# Patient Record
Sex: FEMALE | Race: BLACK OR AFRICAN AMERICAN | NOT HISPANIC OR LATINO | URBAN - METROPOLITAN AREA
[De-identification: names, ages, dates, MRNs, and addresses within clinical notes are randomized per-mention and may not be internally consistent; named-entity substitution may affect disease eponyms.]

---

## 2017-08-03 ENCOUNTER — HISTORICAL (OUTPATIENT)
Dept: ADMINISTRATIVE | Facility: HOSPITAL | Age: 72
End: 2017-08-03

## 2021-02-04 ENCOUNTER — HISTORICAL (OUTPATIENT)
Dept: RADIOLOGY | Facility: HOSPITAL | Age: 76
End: 2021-02-04

## 2021-02-04 LAB
ABS NEUT (OLG): 3.18 X10(3)/MCL (ref 2.1–9.2)
ALBUMIN SERPL-MCNC: 4.4 GM/DL (ref 3.4–4.8)
ALBUMIN/GLOB SERPL: 1.5 RATIO (ref 1.1–2)
ALP SERPL-CCNC: 80 UNIT/L (ref 40–150)
ALT SERPL-CCNC: 11 UNIT/L (ref 0–55)
AST SERPL-CCNC: 19 UNIT/L (ref 5–34)
BASOPHILS # BLD AUTO: 0 X10(3)/MCL (ref 0–0.2)
BASOPHILS NFR BLD AUTO: 1 %
BILIRUB SERPL-MCNC: 0.5 MG/DL
BILIRUBIN DIRECT+TOT PNL SERPL-MCNC: 0.2 MG/DL (ref 0–0.5)
BILIRUBIN DIRECT+TOT PNL SERPL-MCNC: 0.3 MG/DL (ref 0–0.8)
BUN SERPL-MCNC: 14.5 MG/DL (ref 9.8–20.1)
CALCIUM SERPL-MCNC: 9.4 MG/DL (ref 8.4–10.2)
CANCER AG19-9 SERPL-ACNC: <2.06 UNIT/ML (ref 0–37)
CHLORIDE SERPL-SCNC: 101 MMOL/L (ref 98–107)
CO2 SERPL-SCNC: 24 MMOL/L (ref 23–31)
CREAT SERPL-MCNC: 0.75 MG/DL (ref 0.55–1.02)
EOSINOPHIL # BLD AUTO: 0.3 X10(3)/MCL (ref 0–0.9)
EOSINOPHIL NFR BLD AUTO: 5 %
ERYTHROCYTE [DISTWIDTH] IN BLOOD BY AUTOMATED COUNT: 15.8 % (ref 11.5–17)
GLOBULIN SER-MCNC: 2.9 GM/DL (ref 2.4–3.5)
GLUCOSE SERPL-MCNC: 80 MG/DL (ref 82–115)
HCT VFR BLD AUTO: 36.1 % (ref 37–47)
HGB BLD-MCNC: 11.1 GM/DL (ref 12–16)
LYMPHOCYTES # BLD AUTO: 2 X10(3)/MCL (ref 0.6–4.6)
LYMPHOCYTES NFR BLD AUTO: 34 %
MCH RBC QN AUTO: 27.3 PG (ref 27–31)
MCHC RBC AUTO-ENTMCNC: 30.7 GM/DL (ref 33–36)
MCV RBC AUTO: 88.7 FL (ref 80–94)
MONOCYTES # BLD AUTO: 0.4 X10(3)/MCL (ref 0.1–1.3)
MONOCYTES NFR BLD AUTO: 7 %
NEUTROPHILS # BLD AUTO: 3.18 X10(3)/MCL (ref 2.1–9.2)
NEUTROPHILS NFR BLD AUTO: 53 %
PLATELET # BLD AUTO: 306 X10(3)/MCL (ref 130–400)
PMV BLD AUTO: 10.8 FL (ref 9.4–12.4)
POC CREATININE: 0.8 MG/DL (ref 0.6–1.3)
POTASSIUM SERPL-SCNC: 4.5 MMOL/L (ref 3.5–5.1)
PROT SERPL-MCNC: 7.3 GM/DL (ref 5.8–7.6)
RBC # BLD AUTO: 4.07 X10(6)/MCL (ref 4.2–5.4)
SODIUM SERPL-SCNC: 140 MMOL/L (ref 136–145)
WBC # SPEC AUTO: 6 X10(3)/MCL (ref 4.5–11.5)

## 2021-11-10 LAB — BCS RECOMMENDATION EXT: NORMAL

## 2022-04-29 DIAGNOSIS — C23 GALLBLADDER CANCER: Primary | ICD-10-CM

## 2022-05-10 PROBLEM — C23 MALIGNANT NEOPLASM OF GALLBLADDER: Status: ACTIVE | Noted: 2022-05-10

## 2022-05-10 NOTE — PROGRESS NOTES
Patient ID: Ena Kruse is a 76 y.o. female.    Chief Complaint: gallbladder cancer      History of Present Illness  Chief complaint: gallbladder cancer    Cardiology: [facility] Dr Palu    HPI: 77 y/o F w/ PMHx of ROWDY on CPAP (not compliant), OHS, CHF, CAD, PAD/PVD s/p left iliac vein stent, COPD, HTN, HLD, obesity, lymphedema LLE due to blood clot?, GERD presented 12/2020 with symptoms consistent with acute cholecystitis with acute pancreatitis. She had cholecystectomy with Dr Olivo on 12/7/20, incidentally found to have well differentiated adenocarcinoma, +LVI, negative margins, pT2b. Referred to [facility] to establish care.    1/2020 colonoscopy with Dr Sherman showing internal hemorrhoids, diverticulosis. Had GI bleed 1/2020    She opted against adjuvant xeloda and adjuvant RT    Today 5/10/22: Patient here today for follow up visit. She states she feels well overall. Appetite is very good and she has gained about 30 lbs since last August. She admits she has not been watching what she eats and is not very active. She denies fevers, chills, CP, abdominal pain,  n/v/c/d, melena, hematochezia, hematuria, or polyuria. She does feel weak but this is likely due to significant deconditioning. She did not do labs prior to visit as previously ordered. No concerns reported.    PMHx: ROWDY on CPAP (not compliant), OHS, CHF, CAD, PAD/PVD s/p left iliac vein stent, COPD, HTN, HLD, obesity, lymphedema LLE due to blood clot?, GERD  PSHx: tubal ligation, cholecystectomy, kidney stone  Social Hx: denies tobacco, ETOH, drugs  Family Hx: father: lung or throat cancer (smoker), 3 brothers with colon cancer, sister with thyroid caner  Meds: reviewed  Allergies: oxycodone, prednisone    Labs:  1/9/22 CA 19-9 1 CEA <1.73 Cr 0.86 Alb 3.8 WBC 5.43 Hg 10.6 rbc 3.98 MCV 85.9 RDW 16.4   9/20/21 Cr 1.02 Alb 3.9 CEA 2.1 WBC 5.17 Hg 10.7  CA 19-9 <1  8/23/21 CEA 2.0, Cr 1.07, WBC 4.74, Hgb 10.3, , ANC 1.94, CA 19-9  <1  21 CA 19-9 <2 CBC and CMP wnl  20 Hg 9.5  20 Cr 0.92  20 Alb 3.1  12/3/20 amylase 2112  20 Lipase >2000 Tbili 5   AlkPhos 316    Imagin22 CT C/A/P: stable, nonspecific noncalcified bilateral pulmonary nodules. No other significant changes in the chest. S/p digna with no evidence of residual or recurrent tumor in the gallbladder fossa. Allowing for limitations of the exam, no other evidence of metastatic disease to the abdomen or pelvis. No other significant changes in the A/P.     21 screening MMG: BIRADS2    11/15/21 CT A/P w/ IV contrast: no evidence of recurrent tumor    21 CT C/A/P w/ IV contrast: no acute intrathoracic pathology. No evidence of metastatic to the chest. Borderline mild cardiomegaly, atherosclerotic disease, degenerative changes. Prominence of the main pulm artery. No acute intra abdominal or pelvic pathology. No evidence of residual or recurrent tumor. S/p cholecystectomy    21 CT A/P w/wo contrast: No acute abnormality. Cholecystectomy. Left renal cysts. Umbilical hernia no longer present. Atherosclerotic and degenerative changes.  21 CT C/A/P w/ IV contrast: Local recurrence or metastatic disease is not demonstrated. No acute abnormalities are seen. Hepatomegaly.    12/10/20 US RLE: no DVT  20 US abd: calcific sludge and gallstones in gallbladder lumen, consistent with acute cholecystitis. Hepatomegaly and steatosis. CBD borderline dilatation at 7mm    Path:  20 cholecystectomy: invasive adenocarcinoma well differentiated, 4.5x3cm in gallbladder neck, tumor invades perimuscular connective tissue on peritoneal and hepatic sides of gallbladder. Serosal surface, liver parenchyma are free of tumor. Margins are negative. LVI+. No PNI. No regional nodes.  pT2b Nx      Review of Systems  CONSTITUTIONAL: no fevers, no chills, no weight loss, no fatigue, + weakness  HEMATOLOGIC: no abnormal bleeding, no abnormal  bruising, no drenching night sweats  ONCOLOGIC: no new masses or lumps  HEENT: no vision loss, no tinnitus or hearing loss, no nose bleeding, no dysphagia, no odynophagia  CVS: no chest pain, no palpitations, no dyspnea on exertion  RESP: no shortness of breath, no hemoptysis, no cough  BREAST: no nipple discharge, no breast tenderness, no breast masses on self breast examination  GI: no nausea, no vomiting, no diarrhea, no constipation, no melena, no hematochezia, no hematemesis, no abdominal pain, no increase in abdominal girth  : no dysuria, no hematuria, no discharge  GYN: no abnormal vaginal bleeding, no dyspareunia, no vaginal discharge  INTEGUMENT: no rashes, no abnormal bruising, no nail pitting, no hyperpigmentation  NEURO: no falls, no memory loss, no paresthesias or dysesthesias, no urofecal incontinence or retention, no loss of strength on any extremity  MSK: no back pain, no new joint pain, no joint swelling  PSYCH: no suicidal or homicidal ideation, no depression, no insomnia, no anhedonia  ENDOCRINE: no heat or cold intolerance, no polyuria, no polydipsia      Objective:      Physical Exam  Vitals:    05/11/22 1119   BP: (!) 150/85   Pulse: 68   Resp: 16   Temp: 97.4 °F (36.3 °C)        ECOG PS 3  GA: AAOx3, NAD, morbidly obese  HEENT: NCAT, PERRLA, EOMI, poor dentition, no oral ulcers  LYMPH: no cervical, axillary or supraclavicular adenopathy  CVS: s1s2 RRR, no M/R/G  RESP: CTA b/l, no crackles, no wheezes or rhonchi  ABD: soft, NT, ND, BS+, no hepatosplenomegaly. 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 Stage IIB  CT C/A/P w/ IV contrast 2/2021 negative  Seen by SurgOnc and deemed not a surgical candidate due to multiple significant comorbidities  Requested MSI/MMR and PD-L1 on path specimen, no results  We previously discussed recommendation for adjuvant xeloda. Discussed that we can attempt at low dose to see if tolerated. Xeloda based adjuvant therapy would be 14/7 for 8 cycles.  Patient opted for no therapy, will image q6 months for 2 years and then as indicated  Referred to [facility] for opinion for possible adjuvant RT given no surgical intervention, she wished to continue with observation  Most recent imaging 5/2022 HARSHAD  MMG 11/2021 BIRADS2, repeat due in 1 year      Plan:       - Will obtain CBC, CMP, CEA and CA 19-9 today as labs were not done prior to visit as previously ordered. Will notify patient if concerning  - Most recent CT C/A/P 5/2022 stable with no evidence of residual or recurrent disease. Will plan for repeat surveillance scans in 6 months  - Discussed weight loss and exercise  - RTC in 3 months with CBC, CMP, CEA and CA 19-9  - Call if any questions or concerns    ERIN Webster

## 2022-05-11 ENCOUNTER — OFFICE VISIT (OUTPATIENT)
Dept: HEMATOLOGY/ONCOLOGY | Facility: CLINIC | Age: 77
End: 2022-05-11
Payer: MEDICARE

## 2022-05-11 VITALS
OXYGEN SATURATION: 94 % | BODY MASS INDEX: 47.09 KG/M2 | HEART RATE: 68 BPM | DIASTOLIC BLOOD PRESSURE: 85 MMHG | SYSTOLIC BLOOD PRESSURE: 150 MMHG | WEIGHT: 293 LBS | RESPIRATION RATE: 16 BRPM | TEMPERATURE: 97 F | HEIGHT: 66 IN

## 2022-05-11 DIAGNOSIS — C23 MALIGNANT NEOPLASM OF GALLBLADDER: Primary | ICD-10-CM

## 2022-05-11 PROCEDURE — 99213 OFFICE O/P EST LOW 20 MIN: CPT | Mod: ,,, | Performed by: NURSE PRACTITIONER

## 2022-05-11 PROCEDURE — 99213 PR OFFICE/OUTPT VISIT, EST, LEVL III, 20-29 MIN: ICD-10-PCS | Mod: ,,, | Performed by: NURSE PRACTITIONER

## 2022-05-11 RX ORDER — ALBUTEROL SULFATE 90 UG/1
AEROSOL, METERED RESPIRATORY (INHALATION)
COMMUNITY
Start: 2022-04-14

## 2022-05-11 RX ORDER — HYDRALAZINE HYDROCHLORIDE 50 MG/1
50 TABLET, FILM COATED ORAL 3 TIMES DAILY
COMMUNITY
Start: 2022-01-04

## 2022-05-11 RX ORDER — BUDESONIDE AND FORMOTEROL FUMARATE DIHYDRATE 160; 4.5 UG/1; UG/1
2 AEROSOL RESPIRATORY (INHALATION) 2 TIMES DAILY
COMMUNITY
Start: 2022-04-14

## 2022-05-11 RX ORDER — VALSARTAN AND HYDROCHLOROTHIAZIDE 320; 25 MG/1; MG/1
1 TABLET, FILM COATED ORAL DAILY
COMMUNITY
Start: 2022-04-14

## 2022-05-11 RX ORDER — CARVEDILOL 6.25 MG/1
6.25 TABLET ORAL 2 TIMES DAILY
COMMUNITY
Start: 2022-03-21

## 2022-05-11 RX ORDER — EZETIMIBE 10 MG/1
10 TABLET ORAL DAILY
COMMUNITY
Start: 2022-04-14

## 2022-05-11 RX ORDER — GABAPENTIN 300 MG/1
CAPSULE ORAL
COMMUNITY
Start: 2022-01-24

## 2022-05-11 RX ORDER — PANTOPRAZOLE SODIUM 40 MG/1
40 TABLET, DELAYED RELEASE ORAL DAILY
COMMUNITY
Start: 2022-04-14

## 2022-05-11 RX ORDER — IBUPROFEN 600 MG/1
600 TABLET ORAL DAILY
COMMUNITY
Start: 2022-04-21

## 2022-09-29 DIAGNOSIS — C23 MALIGNANT NEOPLASM OF GALLBLADDER: Primary | ICD-10-CM

## 2022-10-11 ENCOUNTER — OFFICE VISIT (OUTPATIENT)
Dept: HEMATOLOGY/ONCOLOGY | Facility: CLINIC | Age: 77
End: 2022-10-11
Payer: MEDICARE

## 2022-10-11 VITALS
HEIGHT: 69 IN | OXYGEN SATURATION: 97 % | HEART RATE: 70 BPM | WEIGHT: 293 LBS | BODY MASS INDEX: 43.4 KG/M2 | SYSTOLIC BLOOD PRESSURE: 174 MMHG | RESPIRATION RATE: 20 BRPM | DIASTOLIC BLOOD PRESSURE: 84 MMHG

## 2022-10-11 DIAGNOSIS — C23 MALIGNANT NEOPLASM OF GALLBLADDER: Primary | ICD-10-CM

## 2022-10-11 PROCEDURE — 99215 OFFICE O/P EST HI 40 MIN: CPT | Mod: ,,,

## 2022-10-11 PROCEDURE — 99215 PR OFFICE/OUTPT VISIT, EST, LEVL V, 40-54 MIN: ICD-10-PCS | Mod: ,,,

## 2022-10-11 NOTE — PROGRESS NOTES
Patient ID: Ena Kruse is a 77 y.o. female.    Chief Complaint: No chief complaint on file.      History of Present Illness  Chief complaint: gallbladder cancer    Cardiology: [facility] Dr Paul    HPI: 75 y/o F w/ PMHx of ROWDY on CPAP (not compliant), OHS, CHF, CAD, PAD/PVD s/p left iliac vein stent, COPD, HTN, HLD, obesity, lymphedema LLE due to blood clot?, GERD presented 12/2020 with symptoms consistent with acute cholecystitis with acute pancreatitis. She had cholecystectomy with Dr Olivo on 12/7/20, incidentally found to have well differentiated adenocarcinoma, +LVI, negative margins, pT2b. Referred to [facility] to establish care.    1/2020 colonoscopy with Dr Sherman showing internal hemorrhoids, diverticulosis. Had GI bleed 1/2020    She opted against adjuvant xeloda and adjuvant RT    Today 10/11/22: Patient here today for follow up visit. She states she feels well overall. Appetite is very good.  She denies fevers, chills, CP, abdominal pain,  n/v/c/d, melena, hematochezia, hematuria, or polyuria. No concerns reported.    PMHx: ROWDY on CPAP (not compliant), OHS, CHF, CAD, PAD/PVD s/p left iliac vein stent, COPD, HTN, HLD, obesity, lymphedema LLE due to blood clot?, GERD  PSHx: tubal ligation, cholecystectomy, kidney stone  Social Hx: denies tobacco, ETOH, drugs  Family Hx: father: lung or throat cancer (smoker), 3 brothers with colon cancer, sister with thyroid caner  Meds: reviewed  Allergies: oxycodone, prednisone    Labs:  8/29/2022 CA 19-9 <2, CEA 1.8, Cr 0.91, Alb 4.0, wbc 4.6, hgb 11.8, plt 270, ANC 2.04  1/9/22 CA 19-9 1 CEA <1.73 Cr 0.86 Alb 3.8 WBC 5.43 Hg 10.6 rbc 3.98 MCV 85.9 RDW 16.4   9/20/21 Cr 1.02 Alb 3.9 CEA 2.1 WBC 5.17 Hg 10.7  CA 19-9 <1  8/23/21 CEA 2.0, Cr 1.07, WBC 4.74, Hgb 10.3, , ANC 1.94, CA 19-9 <1  2/4/21 CA 19-9 <2 CBC and CMP wnl  12/23/20 Hg 9.5  12/17/20 Cr 0.92  12/12/20 Alb 3.1  12/3/20 amylase 2112  12/2/20 Lipase >2000 Tbili 5    AlkPhos 316    Imagin22 CT C/A/P: stable, nonspecific noncalcified bilateral pulmonary nodules. No other significant changes in the chest. S/p digna with no evidence of residual or recurrent tumor in the gallbladder fossa. Allowing for limitations of the exam, no other evidence of metastatic disease to the abdomen or pelvis. No other significant changes in the A/P.     21 screening MMG: BIRADS2    11/15/21 CT A/P w/ IV contrast: no evidence of recurrent tumor    21 CT C/A/P w/ IV contrast: no acute intrathoracic pathology. No evidence of metastatic to the chest. Borderline mild cardiomegaly, atherosclerotic disease, degenerative changes. Prominence of the main pulm artery. No acute intra abdominal or pelvic pathology. No evidence of residual or recurrent tumor. S/p cholecystectomy    21 CT A/P w/wo contrast: No acute abnormality. Cholecystectomy. Left renal cysts. Umbilical hernia no longer present. Atherosclerotic and degenerative changes.  21 CT C/A/P w/ IV contrast: Local recurrence or metastatic disease is not demonstrated. No acute abnormalities are seen. Hepatomegaly.    12/10/20 US RLE: no DVT  20 US abd: calcific sludge and gallstones in gallbladder lumen, consistent with acute cholecystitis. Hepatomegaly and steatosis. CBD borderline dilatation at 7mm    Path:  20 cholecystectomy: invasive adenocarcinoma well differentiated, 4.5x3cm in gallbladder neck, tumor invades perimuscular connective tissue on peritoneal and hepatic sides of gallbladder. Serosal surface, liver parenchyma are free of tumor. Margins are negative. LVI+. No PNI. No regional nodes.  pT2b Nx      Review of Systems  CONSTITUTIONAL: no fevers, no chills, no weight loss, no fatigue, + weakness  HEMATOLOGIC: no abnormal bleeding, no abnormal bruising, no drenching night sweats  ONCOLOGIC: no new masses or lumps  HEENT: no vision loss, no tinnitus or hearing loss, no nose bleeding, no dysphagia, no  odynophagia  CVS: no chest pain, no palpitations, no dyspnea on exertion  RESP: no shortness of breath, no hemoptysis, no cough  BREAST: no nipple discharge, no breast tenderness, no breast masses on self breast examination  GI: no nausea, no vomiting, no diarrhea, no constipation, no melena, no hematochezia, no hematemesis, no abdominal pain, no increase in abdominal girth  : no dysuria, no hematuria, no discharge  GYN: no abnormal vaginal bleeding, no dyspareunia, no vaginal discharge  INTEGUMENT: no rashes, no abnormal bruising, no nail pitting, no hyperpigmentation  NEURO: no falls, no memory loss, no paresthesias or dysesthesias, no urofecal incontinence or retention, no loss of strength on any extremity  MSK: no back pain, no new joint pain, no joint swelling  PSYCH: no suicidal or homicidal ideation, no depression, no insomnia, no anhedonia  ENDOCRINE: no heat or cold intolerance, no polyuria, no polydipsia      Objective:      Physical Exam  Vitals:    10/11/22 1129   BP: (!) 174/84   Pulse: 70   Resp: 20          ECOG PS 3  GA: AAOx3, NAD, morbidly obese  HEENT: NCAT, PERRLA, EOMI, poor dentition, no oral ulcers  LYMPH: no cervical, axillary or supraclavicular adenopathy  CVS: s1s2 RRR, no M/R/G  RESP: CTA b/l, no crackles, no wheezes or rhonchi  ABD: soft, NT, ND, BS+, no hepatosplenomegaly. 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 Stage IIB  CT C/A/P w/ IV contrast 2/2021 negative  Seen by SurgOnc and deemed not a surgical candidate due to multiple significant comorbidities  Requested MSI/MMR and PD-L1 on path specimen, no results  We previously discussed  recommendation for adjuvant xeloda. Discussed that we can attempt at low dose to see if tolerated. Xeloda based adjuvant therapy would be 14/7 for 8 cycles.  Patient opted for no therapy, will image q6 months for 2 years and then as indicated  Referred to [facility] for opinion for possible adjuvant RT given no surgical intervention, she wished to continue with observation  Most recent imaging 5/2022 HARSHAD  MMG 11/2021 BIRADS2, repeat due in 1 year      Plan:   Most recent CT C/A/P 5/2022 stable with no evidence of residual or recurrent disease.  Repeat CT CAP before return in 12/2022  CBC, CMP  CEA and CA 19-9 WNL today (repeat next year)  Reinforced the need for weight loss  - Call if any questions or concerns      Deepti Ramos, DANIELP-C

## 2023-02-02 ENCOUNTER — DOCUMENTATION ONLY (OUTPATIENT)
Dept: ADMINISTRATIVE | Facility: HOSPITAL | Age: 78
End: 2023-02-02
Payer: MEDICARE

## 2023-02-22 NOTE — PROGRESS NOTES
Patient ID: Ena Kruse is a 77 y.o. female.    Chief Complaint: No complaint        History of Present Illness  Chief complaint: gallbladder cancer    Cardiology: [facility] Dr Paul    HPI: 75 y/o F w/ PMHx of ROWDY on CPAP (not compliant), OHS, CHF, CAD, PAD/PVD s/p left iliac vein stent, COPD, HTN, HLD, obesity, lymphedema LLE due to blood clot?, GERD presented 12/2020 with symptoms consistent with acute cholecystitis with acute pancreatitis. She had cholecystectomy with Dr Olivo on 12/7/20, incidentally found to have well differentiated adenocarcinoma, +LVI, negative margins, pT2b. Referred to [facility] to establish care.    1/2020 colonoscopy with Dr Sherman showing internal hemorrhoids, diverticulosis. Had GI bleed 1/2020    She opted against adjuvant xeloda and adjuvant RT    Today 02/23/2023 :    Patient here today for follow up visit. She states she feels well overall. Appetite is very good.  She denies fevers, chills, CP, abdominal pain,  n/v/c/d, melena, hematochezia, hematuria, or polyuria. No concerns reported.      PMHx: ROWDY on CPAP (not compliant), OHS, CHF, CAD, PAD/PVD s/p left iliac vein stent, COPD, HTN, HLD, obesity, lymphedema LLE due to blood clot?, GERD  PSHx: tubal ligation, cholecystectomy, kidney stone  Social Hx: denies tobacco, ETOH, drugs  Family Hx: father: lung or throat cancer (smoker), 3 brothers with colon cancer, sister with thyroid caner  Meds: reviewed  Allergies: oxycodone, prednisone    Labs:  02/20/2023:  Creatinine 1.09, AST 16, ALT 13, TSH 1.5, WBC 4.4, hemoglobin 12.2, platelets 258  8/29/2022 CA 19-9 <2, CEA 1.8, Cr 0.91, Alb 4.0, wbc 4.6, hgb 11.8, plt 270, ANC 2.04  1/9/22 CA 19-9 1 CEA <1.73 Cr 0.86 Alb 3.8 WBC 5.43 Hg 10.6 rbc 3.98 MCV 85.9 RDW 16.4   9/20/21 Cr 1.02 Alb 3.9 CEA 2.1 WBC 5.17 Hg 10.7  CA 19-9 <1  8/23/21 CEA 2.0, Cr 1.07, WBC 4.74, Hgb 10.3, , ANC 1.94, CA 19-9 <1  2/4/21 CA 19-9 <2 CBC and CMP wnl  12/23/20 Hg 9.5  12/17/20 Cr  0.92  12/12/20 Alb 3.1  12/3/20 amylase 2112  12/2/20 Lipase >2000 Tbili 5   AlkPhos 316    Imaging:  - 1/17/2023 CT CAP:  Stable tiny vague pulmonary nodules within the left lung field.  No evidence of recurrent tumor.  No evidence of metastatic disease    5/9/22 CT C/A/P: stable, nonspecific noncalcified bilateral pulmonary nodules. No other significant changes in the chest. S/p digna with no evidence of residual or recurrent tumor in the gallbladder fossa. Allowing for limitations of the exam, no other evidence of metastatic disease to the abdomen or pelvis. No other significant changes in the A/P.     11/17/21 screening MMG: BIRADS2    11/15/21 CT A/P w/ IV contrast: no evidence of recurrent tumor    9/20/21 CT C/A/P w/ IV contrast: no acute intrathoracic pathology. No evidence of metastatic to the chest. Borderline mild cardiomegaly, atherosclerotic disease, degenerative changes. Prominence of the main pulm artery. No acute intra abdominal or pelvic pathology. No evidence of residual or recurrent tumor. S/p cholecystectomy    6/14/21 CT A/P w/wo contrast: No acute abnormality. Cholecystectomy. Left renal cysts. Umbilical hernia no longer present. Atherosclerotic and degenerative changes.  2/4/21 CT C/A/P w/ IV contrast: Local recurrence or metastatic disease is not demonstrated. No acute abnormalities are seen. Hepatomegaly.    12/10/20 US RLE: no DVT  12/2/20 US abd: calcific sludge and gallstones in gallbladder lumen, consistent with acute cholecystitis. Hepatomegaly and steatosis. CBD borderline dilatation at 7mm    Path:  12/7/20 cholecystectomy: invasive adenocarcinoma well differentiated, 4.5x3cm in gallbladder neck, tumor invades perimuscular connective tissue on peritoneal and hepatic sides of gallbladder. Serosal surface, liver parenchyma are free of tumor. Margins are negative. LVI+. No PNI. No regional nodes.  pT2b Nx      Review of Systems   Constitutional: Negative.  Negative for  chills, fever and malaise/fatigue.   HENT: Negative.     Eyes: Negative.    Respiratory: Negative.  Negative for cough and shortness of breath.    Cardiovascular: Negative.  Negative for chest pain.   Gastrointestinal: Negative.  Negative for abdominal pain, diarrhea, nausea and vomiting.   Genitourinary: Negative.    Musculoskeletal: Negative.    Skin: Negative.  Negative for itching and rash.   Neurological: Negative.  Negative for focal weakness.   Endo/Heme/Allergies: Negative.    Psychiatric/Behavioral: Negative.          Objective:      Physical Exam  Vitals:    02/23/23 1527   BP: (!) 164/90   Pulse: 64   Resp: 18   Temp: 97.4 °F (36.3 °C)            ECOG PS 3  GA: AAOx3, NAD, morbidly obese  HEENT: NCAT, PERRLA, EOMI, poor dentition, no oral ulcers  LYMPH: no cervical, axillary or supraclavicular adenopathy  CVS: s1s2 RRR, no M/R/G  RESP: CTA b/l, no crackles, no wheezes or rhonchi  ABD: soft, NT, ND, BS+, no hepatosplenomegaly. 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 Stage IIB  CT C/A/P w/ IV contrast 2/2021 negative  Seen by SurgOnc and deemed not a surgical candidate due to multiple significant comorbidities  Requested MSI/MMR and PD-L1 on path specimen, no results  We previously discussed recommendation for adjuvant xeloda. Discussed that we can attempt at low dose to see if tolerated. Xeloda based adjuvant therapy would be 14/7 for 8 cycles.  Patient opted for no therapy, will image q6 months for 2 years and then as indicated  Referred to [facility] for opinion for possible adjuvant RT given no surgical intervention, she wished to continue with observation  Most recent  imaging 5/2022 HARSHAD  MMG 11/2021 BIRADS2, repeat due in 1 year  CT C/A/P 1/2023 stable with no evidence of residual or recurrent disease.      Plan:       - labs and imaging reviewed, continue with surveillance with repeat imaging every 3-6 months for 2 years then every 6-12 months for up to 5 years or as clinically indicated.  Continue to monitor CEA and CA 19-9  - Repeat labs on follow up: CBC, CMP, CEA,    - MD / LABS VISIT - 12 WEEKS     The patient was seen, interviewed and examined. Pertinent lab and radiology studies were reviewed. Pt instructed to call should develop concerning signs/symptoms or have further questions.       Portions of the record may have been created with voice recognition software. Occasional wrong-word or sound-a-like substitutions may have occurred due to the inherent limitations of voice recognition software. Read the chart carefully and recognize, using context, where substitutions have occurred.    Caroline Tatum MD  Hematology / Oncology

## 2023-02-23 ENCOUNTER — OFFICE VISIT (OUTPATIENT)
Dept: HEMATOLOGY/ONCOLOGY | Facility: CLINIC | Age: 78
End: 2023-02-23
Payer: MEDICARE

## 2023-02-23 VITALS
HEART RATE: 64 BPM | SYSTOLIC BLOOD PRESSURE: 164 MMHG | RESPIRATION RATE: 18 BRPM | BODY MASS INDEX: 43.4 KG/M2 | OXYGEN SATURATION: 98 % | DIASTOLIC BLOOD PRESSURE: 90 MMHG | HEIGHT: 69 IN | TEMPERATURE: 97 F | WEIGHT: 293 LBS

## 2023-02-23 DIAGNOSIS — C23 MALIGNANT NEOPLASM OF GALLBLADDER: Primary | ICD-10-CM

## 2023-02-23 PROCEDURE — 99214 OFFICE O/P EST MOD 30 MIN: CPT | Mod: ,,, | Performed by: INTERNAL MEDICINE

## 2023-02-23 PROCEDURE — 99214 PR OFFICE/OUTPT VISIT, EST, LEVL IV, 30-39 MIN: ICD-10-PCS | Mod: ,,, | Performed by: INTERNAL MEDICINE

## 2023-02-23 RX ORDER — FLUTICASONE PROPIONATE 50 MCG
2 SPRAY, SUSPENSION (ML) NASAL DAILY
COMMUNITY
Start: 2023-02-13

## 2023-02-23 RX ORDER — ISOSORBIDE MONONITRATE 60 MG/1
60 TABLET, EXTENDED RELEASE ORAL 2 TIMES DAILY
COMMUNITY
Start: 2023-01-23

## 2023-02-23 RX ORDER — FUROSEMIDE 40 MG/1
40 TABLET ORAL EVERY MORNING
COMMUNITY
Start: 2023-01-23

## 2023-02-23 RX ORDER — OMEPRAZOLE 40 MG/1
1 CAPSULE, DELAYED RELEASE ORAL DAILY
COMMUNITY

## 2023-02-23 RX ORDER — NITROGLYCERIN 0.4 MG/1
TABLET SUBLINGUAL
COMMUNITY
Start: 2022-11-15

## 2023-02-23 RX ORDER — CLOPIDOGREL BISULFATE 75 MG/1
75 TABLET ORAL DAILY
COMMUNITY
Start: 2022-10-27

## 2023-02-23 RX ORDER — MIRABEGRON 25 MG/1
1 TABLET, FILM COATED, EXTENDED RELEASE ORAL DAILY
COMMUNITY

## 2023-05-23 ENCOUNTER — OFFICE VISIT (OUTPATIENT)
Dept: HEMATOLOGY/ONCOLOGY | Facility: CLINIC | Age: 78
End: 2023-05-23
Payer: MEDICARE

## 2023-05-23 VITALS
HEIGHT: 69 IN | WEIGHT: 293 LBS | OXYGEN SATURATION: 96 % | SYSTOLIC BLOOD PRESSURE: 176 MMHG | DIASTOLIC BLOOD PRESSURE: 90 MMHG | TEMPERATURE: 97 F | RESPIRATION RATE: 20 BRPM | HEART RATE: 67 BPM | BODY MASS INDEX: 43.4 KG/M2

## 2023-05-23 DIAGNOSIS — C23 MALIGNANT NEOPLASM OF GALLBLADDER: Primary | ICD-10-CM

## 2023-05-23 PROCEDURE — 99215 OFFICE O/P EST HI 40 MIN: CPT | Mod: ,,,

## 2023-05-23 PROCEDURE — 99215 PR OFFICE/OUTPT VISIT, EST, LEVL V, 40-54 MIN: ICD-10-PCS | Mod: ,,,

## 2023-05-23 RX ORDER — METHOCARBAMOL 750 MG/1
TABLET, FILM COATED ORAL
COMMUNITY
Start: 2022-08-02

## 2023-05-23 RX ORDER — POTASSIUM CHLORIDE 750 MG/1
CAPSULE, EXTENDED RELEASE ORAL
COMMUNITY

## 2023-05-23 RX ORDER — ROSUVASTATIN CALCIUM 40 MG/1
40 TABLET, COATED ORAL
COMMUNITY
Start: 2023-03-24

## 2023-05-23 RX ORDER — ASPIRIN 81 MG/1
TABLET ORAL
COMMUNITY
Start: 2022-08-02

## 2023-05-23 RX ORDER — VALSARTAN 320 MG/1
320 TABLET ORAL
COMMUNITY
Start: 2023-02-03

## 2023-05-23 NOTE — PROGRESS NOTES
Patient ID: Ena Kruse is a 77 y.o. female.    Chief Complaint: No chief complaint on file.        History of Present Illness  Chief complaint: gallbladder cancer    Cardiology: [facility] Dr Paul    HPI: 75 y/o F w/ PMHx of ROWDY on CPAP (not compliant), OHS, CHF, CAD, PAD/PVD s/p left iliac vein stent, COPD, HTN, HLD, obesity, lymphedema LLE due to blood clot?, GERD presented 12/2020 with symptoms consistent with acute cholecystitis with acute pancreatitis. She had cholecystectomy with Dr Olivo on 12/7/20, incidentally found to have well differentiated adenocarcinoma, +LVI, negative margins, pT2b. Referred to [facility] to establish care.    1/2020 colonoscopy with Dr Sherman showing internal hemorrhoids, diverticulosis. Had GI bleed 1/2020    She opted against adjuvant xeloda and adjuvant RT    Today 05/23/2023 Patient here today for follow up visit. She states she feels well overall. Appetite is very good.  She denies fevers, chills, CP, abdominal pain,  n/v/c/d, melena, hematochezia, hematuria, or polyuria. No concerns reported.      PMHx: ROWDY on CPAP (not compliant), OHS, CHF, CAD, PAD/PVD s/p left iliac vein stent, COPD, HTN, HLD, obesity, lymphedema LLE due to blood clot?, GERD  PSHx: tubal ligation, cholecystectomy, kidney stone  Social Hx: denies tobacco, ETOH, drugs  Family Hx: father: lung or throat cancer (smoker), 3 brothers with colon cancer, sister with thyroid caner  Meds: reviewed  Allergies: oxycodone, prednisone    Labs:  05/16/23:CEA <1.73, cr 0.90, alb 4.0, ca 9.38, LFTs WNL, Ca 19-9 < 2, wbc 4.52, hgb 12.7, plt 255, ANC 2.28  02/20/2023:  Creatinine 1.09, AST 16, ALT 13, TSH 1.5, WBC 4.4, hemoglobin 12.2, platelets 258  8/29/2022 CA 19-9 <2, CEA 1.8, Cr 0.91, Alb 4.0, wbc 4.6, hgb 11.8, plt 270, ANC 2.04  1/9/22 CA 19-9 1 CEA <1.73 Cr 0.86 Alb 3.8 WBC 5.43 Hg 10.6 rbc 3.98 MCV 85.9 RDW 16.4   9/20/21 Cr 1.02 Alb 3.9 CEA 2.1 WBC 5.17 Hg 10.7  CA 19-9 <1  8/23/21 CEA 2.0, Cr  1.07, WBC 4.74, Hgb 10.3, , ANC 1.94, CA 19-9 <1  21 CA 19-9 <2 CBC and CMP wnl  20 Hg 9.5  20 Cr 0.92  20 Alb 3.1  12/3/20 amylase 2112  20 Lipase >2000 Tbili 5   AlkPhos 316    Imagin23: MMG BIRADS 1 negative  - 2023 CT CAP:  Stable tiny vague pulmonary nodules within the left lung field.  No evidence of recurrent tumor.  No evidence of metastatic disease    22 CT C/A/P: stable, nonspecific noncalcified bilateral pulmonary nodules. No other significant changes in the chest. S/p digna with no evidence of residual or recurrent tumor in the gallbladder fossa. Allowing for limitations of the exam, no other evidence of metastatic disease to the abdomen or pelvis. No other significant changes in the A/P.     21 screening MMG: BIRADS2    11/15/21 CT A/P w/ IV contrast: no evidence of recurrent tumor    21 CT C/A/P w/ IV contrast: no acute intrathoracic pathology. No evidence of metastatic to the chest. Borderline mild cardiomegaly, atherosclerotic disease, degenerative changes. Prominence of the main pulm artery. No acute intra abdominal or pelvic pathology. No evidence of residual or recurrent tumor. S/p cholecystectomy    21 CT A/P w/wo contrast: No acute abnormality. Cholecystectomy. Left renal cysts. Umbilical hernia no longer present. Atherosclerotic and degenerative changes.  21 CT C/A/P w/ IV contrast: Local recurrence or metastatic disease is not demonstrated. No acute abnormalities are seen. Hepatomegaly.    12/10/20 US RLE: no DVT  20 US abd: calcific sludge and gallstones in gallbladder lumen, consistent with acute cholecystitis. Hepatomegaly and steatosis. CBD borderline dilatation at 7mm    Path:  20 cholecystectomy: invasive adenocarcinoma well differentiated, 4.5x3cm in gallbladder neck, tumor invades perimuscular connective tissue on peritoneal and hepatic sides of gallbladder. Serosal surface, liver parenchyma  are free of tumor. Margins are negative. LVI+. No PNI. No regional nodes.  pT2b Nx      Review of Systems   Constitutional: Negative.  Negative for chills, fever and malaise/fatigue.   HENT: Negative.     Eyes: Negative.    Respiratory: Negative.  Negative for cough and shortness of breath.    Cardiovascular: Negative.  Negative for chest pain.   Gastrointestinal: Negative.  Negative for abdominal pain, diarrhea, nausea and vomiting.   Genitourinary: Negative.    Musculoskeletal: Negative.    Skin: Negative.  Negative for itching and rash.   Neurological: Negative.  Negative for focal weakness.   Endo/Heme/Allergies: Negative.    Psychiatric/Behavioral: Negative.          Objective:      Physical Exam  Vitals:    05/23/23 1500   BP: (!) 176/90   Pulse: 67   Resp: 20   Temp: 97.2 °F (36.2 °C)              ECOG PS 3  GA: AAOx3, NAD, morbidly obese  HEENT: NCAT, PERRLA, EOMI, poor dentition, no oral ulcers  LYMPH: no cervical, axillary or supraclavicular adenopathy  CVS: s1s2 RRR, no M/R/G  RESP: CTA b/l, no crackles, no wheezes or rhonchi  ABD: soft, NT, ND, BS+, no hepatosplenomegaly. 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 Stage IIB  CT C/A/P w/ IV contrast 2/2021 negative  Seen by SurgOnc and deemed not a surgical candidate due to multiple significant comorbidities  Requested MSI/MMR and PD-L1 on path specimen, no results  We previously discussed recommendation for adjuvant xeloda. Discussed that we can attempt at low dose to see if tolerated. Xeloda based adjuvant therapy would be 14/7 for 8 cycles.  Patient opted for no therapy, will image q6 months for 2 years and then as  indicated  Referred to [facility] for opinion for possible adjuvant RT given no surgical intervention, she wished to continue with observation  Most recent imaging 5/2022 [facility]  MMG 11/2021 BIRADS2, repeat due in 1 year  CT C/A/P 1/2023 stable with no evidence of residual or recurrent disease.      Plan:       - labs and MMG reviewed, continue with surveillance with repeat imaging every 3-6 months for 2 years then every 6-12 months for up to 5 years or as clinically indicated.      - Continue to monitor CEA and CA 19-9  - Repeat labs on follow up: CBC, CMP, CEA, , along with repeat CT CAP  - RTC 3 months

## 2023-08-22 ENCOUNTER — OFFICE VISIT (OUTPATIENT)
Dept: HEMATOLOGY/ONCOLOGY | Facility: CLINIC | Age: 78
End: 2023-08-22
Payer: MEDICARE

## 2023-08-22 VITALS — WEIGHT: 293 LBS | HEIGHT: 69 IN | BODY MASS INDEX: 43.4 KG/M2

## 2023-08-22 DIAGNOSIS — C23 MALIGNANT NEOPLASM OF GALLBLADDER: Primary | ICD-10-CM

## 2023-08-22 PROCEDURE — 99442 PR PHYSICIAN TELEPHONE EVALUATION 11-20 MIN: CPT | Mod: 95,,,

## 2023-08-22 PROCEDURE — 99442 PR PHYSICIAN TELEPHONE EVALUATION 11-20 MIN: ICD-10-PCS | Mod: 95,,,

## 2023-08-22 RX ORDER — TIOTROPIUM BROMIDE 18 UG/1
18 CAPSULE ORAL; RESPIRATORY (INHALATION)
COMMUNITY

## 2023-08-22 RX ORDER — AMLODIPINE BESYLATE 5 MG/1
TABLET ORAL
COMMUNITY

## 2023-08-22 NOTE — PROGRESS NOTES
Established Patient - Audio Only Telehealth Visit     The patient location is: Home  The chief complaint leading to consultation is: Gallbladder cancer  Visit type: Virtual visit with audio only (telephone)  Total time spent with patient: 15 minutes       The reason for the audio only service rather than synchronous audio and video virtual visit was related to technical difficulties or patient preference/necessity.     Each patient to whom I provide medical services by telemedicine is:  (1) informed of the relationship between the physician and patient and the respective role of any other health care provider with respect to management of the patient; and (2) notified that they may decline to receive medical services by telemedicine and may withdraw from such care at any time. Patient verbally consented to receive this service via voice-only telephone call.          Patient ID: Ena Kruse is a 77 y.o. female.    Chief Complaint: Other (Cold, with cough and congestion no fever )        History of Present Illness  Chief complaint: gallbladder cancer    Cardiology: [facility] Dr Paul    HPI: 77 y/o F w/ PMHx of ROWDY on CPAP (not compliant), OHS, CHF, CAD, PAD/PVD s/p left iliac vein stent, COPD, HTN, HLD, obesity, lymphedema LLE due to blood clot?, GERD presented 12/2020 with symptoms consistent with acute cholecystitis with acute pancreatitis. She had cholecystectomy with Dr Olivo on 12/7/20, incidentally found to have well differentiated adenocarcinoma, +LVI, negative margins, pT2b. Referred to [facility] to establish care.    1/2020 colonoscopy with Dr Sherman showing internal hemorrhoids, diverticulosis. Had GI bleed 1/2020    She opted against adjuvant xeloda and adjuvant RT    Today 8/22/2023 Patient via telephone today for follow up visit. She has a history of COPD and reports worsening cough over the last 2 weeks. She denies fever or wheezing. She reports using inhaler and is taking OTC robitussin. She contacted  PCP office this morning and waiting for nurse to call back. She denies fevers, chills, CP, abdominal pain,  n/v/c/d, melena, hematochezia, hematuria, or polyuria. No concerns reported.      PMHx: ROWDY on CPAP (not compliant), OHS, CHF, CAD, PAD/PVD s/p left iliac vein stent, COPD, HTN, HLD, obesity, lymphedema LLE due to blood clot?, GERD  PSHx: tubal ligation, cholecystectomy, kidney stone  Social Hx: denies tobacco, ETOH, drugs  Family Hx: father: lung or throat cancer (smoker), 3 brothers with colon cancer, sister with thyroid caner  Meds: reviewed  Allergies: oxycodone, prednisone    Labs:  23: CEA 1.8, CA 19-9 <2,cr 0.85, alb 4.0, ca 9.5, LFTs WNL, wbc 4.12, hgb 12.1, plt 230, ANC 1.76  23:CEA <1.73, cr 0.90, alb 4.0, ca 9.38, LFTs WNL, Ca 19-9 < 2, wbc 4.52, hgb 12.7, plt 255, ANC 2.28  2023:  Creatinine 1.09, AST 16, ALT 13, TSH 1.5, WBC 4.4, hemoglobin 12.2, platelets 258  2022 CA 19-9 <2, CEA 1.8, Cr 0.91, Alb 4.0, wbc 4.6, hgb 11.8, plt 270, ANC 2.04  22 CA 19-9 1 CEA <1.73 Cr 0.86 Alb 3.8 WBC 5.43 Hg 10.6 rbc 3.98 MCV 85.9 RDW 16.4   21 Cr 1.02 Alb 3.9 CEA 2.1 WBC 5.17 Hg 10.7  CA 19-9 <1  21 CEA 2.0, Cr 1.07, WBC 4.74, Hgb 10.3, , ANC 1.94, CA 19-9 <1  21 CA 19-9 <2 CBC and CMP wnl  20 Hg 9.5  20 Cr 0.92  20 Alb 3.1  12/3/20 amylase 2112  20 Lipase >2000 Tbili 5   AlkPhos 316    Imagin23 CT C/A/P No evidence of residual or recurrent disease. Pulmonary nodules not noted on present imaging as seen previously on 23 .  23: MMG BIRADS 1 negative  - 2023 CT CAP:  Stable tiny vague pulmonary nodules within the left lung field.  No evidence of recurrent tumor.  No evidence of metastatic disease    22 CT C/A/P: stable, nonspecific noncalcified bilateral pulmonary nodules. No other significant changes in the chest. S/p digna with no evidence of residual or recurrent tumor in the  gallbladder fossa. Allowing for limitations of the exam, no other evidence of metastatic disease to the abdomen or pelvis. No other significant changes in the A/P.     11/17/21 screening MMG: BIRADS2    11/15/21 CT A/P w/ IV contrast: no evidence of recurrent tumor    9/20/21 CT C/A/P w/ IV contrast: no acute intrathoracic pathology. No evidence of metastatic to the chest. Borderline mild cardiomegaly, atherosclerotic disease, degenerative changes. Prominence of the main pulm artery. No acute intra abdominal or pelvic pathology. No evidence of residual or recurrent tumor. S/p cholecystectomy    6/14/21 CT A/P w/wo contrast: No acute abnormality. Cholecystectomy. Left renal cysts. Umbilical hernia no longer present. Atherosclerotic and degenerative changes.  2/4/21 CT C/A/P w/ IV contrast: Local recurrence or metastatic disease is not demonstrated. No acute abnormalities are seen. Hepatomegaly.    12/10/20 US RLE: no DVT  12/2/20 US abd: calcific sludge and gallstones in gallbladder lumen, consistent with acute cholecystitis. Hepatomegaly and steatosis. CBD borderline dilatation at 7mm    Path:  12/7/20 cholecystectomy: invasive adenocarcinoma well differentiated, 4.5x3cm in gallbladder neck, tumor invades perimuscular connective tissue on peritoneal and hepatic sides of gallbladder. Serosal surface, liver parenchyma are free of tumor. Margins are negative. LVI+. No PNI. No regional nodes.  pT2b Nx      Review of Systems   Constitutional: Negative.  Negative for chills, fever and malaise/fatigue.   HENT: Negative.     Eyes: Negative.    Respiratory: Negative.  Negative for cough and shortness of breath.    Cardiovascular: Negative.  Negative for chest pain.   Gastrointestinal: Negative.  Negative for abdominal pain, diarrhea, nausea and vomiting.   Genitourinary: Negative.    Musculoskeletal: Negative.    Skin: Negative.  Negative for itching and rash.   Neurological: Negative.  Negative for focal weakness.    Endo/Heme/Allergies: Negative.    Psychiatric/Behavioral: Negative.            Objective:      Physical Exam  There were no vitals filed for this visit.             ECOG PS 3  GA: AAOx3, NAD, morbidly obese  HEENT: NCAT, PERRLA, EOMI, poor dentition, no oral ulcers  LYMPH: no cervical, axillary or supraclavicular adenopathy  CVS: s1s2 RRR, no M/R/G  RESP: CTA b/l, no crackles, no wheezes or rhonchi  ABD: soft, NT, ND, BS+, no hepatosplenomegaly. 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 Stage IIB  CT C/A/P w/ IV contrast 2/2021 negative  Seen by SurgOnc and deemed not a surgical candidate due to multiple significant comorbidities  Requested MSI/MMR and PD-L1 on path specimen, no results  We previously discussed recommendation for adjuvant xeloda. Discussed that we can attempt at low dose to see if tolerated. Xeloda based adjuvant therapy would be 14/7 for 8 cycles.  Patient opted for no therapy, will image q6 months for 2 years and then as indicated  Referred to [facility] for opinion for possible adjuvant RT given no surgical intervention, she wished to continue with observation  Most recent imaging 5/2022 [facility]  MMG 11/2021 BIRADS2, repeat due in 1 year  CT C/A/P 1/2023 stable with no evidence of residual or recurrent disease.      Plan:       - labs and Imaging reviewed, continue with surveillance with repeat imaging every 3-6 months for 2 years then every 6-12 months for up to 5 years or as clinically indicated. - Continue to monitor CEA and CA 19-9  - Repeat labs on follow up: CBC, CMP, CEA,    - RTC 3 months  - Plan to repeat CT CAP 2/16/2023  - Follow-up with PCP for  COPD                           This service was not originating from a related E/M service provided within the previous 7 days nor will  to an E/M service or procedure within the next 24 hours or my soonest available appointment.  Prevailing standard of care was able to be met in this audio-only visit.

## 2023-11-28 ENCOUNTER — OFFICE VISIT (OUTPATIENT)
Dept: HEMATOLOGY/ONCOLOGY | Facility: CLINIC | Age: 78
End: 2023-11-28
Payer: MEDICARE

## 2023-11-28 VITALS
RESPIRATION RATE: 20 BRPM | DIASTOLIC BLOOD PRESSURE: 77 MMHG | HEIGHT: 69 IN | WEIGHT: 293 LBS | TEMPERATURE: 98 F | HEART RATE: 60 BPM | BODY MASS INDEX: 43.4 KG/M2 | OXYGEN SATURATION: 97 % | SYSTOLIC BLOOD PRESSURE: 173 MMHG

## 2023-11-28 DIAGNOSIS — C23 MALIGNANT NEOPLASM OF GALLBLADDER: Primary | ICD-10-CM

## 2023-11-28 PROCEDURE — 99213 OFFICE O/P EST LOW 20 MIN: CPT | Mod: ,,, | Performed by: NURSE PRACTITIONER

## 2023-11-28 PROCEDURE — 99213 PR OFFICE/OUTPT VISIT, EST, LEVL III, 20-29 MIN: ICD-10-PCS | Mod: ,,, | Performed by: NURSE PRACTITIONER

## 2023-11-28 RX ORDER — NYSTATIN 100000 U/G
CREAM TOPICAL 2 TIMES DAILY
COMMUNITY
Start: 2023-09-13

## 2023-11-28 RX ORDER — RANOLAZINE 500 MG/1
500 TABLET, EXTENDED RELEASE ORAL 2 TIMES DAILY
COMMUNITY
Start: 2023-11-15

## 2023-11-28 NOTE — PROGRESS NOTES
Patient ID: Ena Kruse is a 78 y.o. female.    Chief Complaint: Here for my check up.      History of Present Illness    Cardiology: [facility] Dr Paul    HPI: 75 y/o F w/ PMHx of ROWDY on CPAP (not compliant), OHS, CHF, CAD, PAD/PVD s/p left iliac vein stent, COPD, HTN, HLD, obesity, lymphedema LLE due to blood clot?, GERD presented 12/2020 with symptoms consistent with acute cholecystitis with acute pancreatitis. She had cholecystectomy with Dr Olivo on 12/7/20, incidentally found to have well differentiated adenocarcinoma, +LVI, negative margins, pT2b. Referred to [facility] to establish care.    1/2020 colonoscopy with Dr Sherman showing internal hemorrhoids, diverticulosis. Had GI bleed 1/2020    She opted against adjuvant xeloda and adjuvant RT    Interval History:    11/28/2023:  Mrs. Kruse is here today for her follow-up and labs for her history of gallbladder cancer with no adjuvant treatment.  Today, she reports, she is doing well. She has severe lymphedema in both lower legs, with left leg wrapped due to seepage of fluid.  She is in a wheelchair due to excess weight of 370 pounds and the lymph edema.  She denies any pain in the abdomen, bloating, indigestion, nausea, vomiting, fever, loss of appetite, change in bowel or bladder habits, no cough, confirms SOB with movement.  Lab reviewed with patient dated 11/21/2023:  Creatinine 0.87, liver enzymes WNL, CEA less than 1.73, WBC 5.60, Hgb 11.7, Hct 37.5, and plt 244,000.  CA 19-0 less than 2.  She has had no recent falls.  Eating well.  No recent hospitalizations, illness, or infections.      PMHx: ROWDY on CPAP (not compliant), OHS, CHF, CAD, PAD/PVD s/p left iliac vein stent, COPD, HTN, HLD, obesity, lymphedema LLE due to blood clot?, GERD  PSHx: tubal ligation, cholecystectomy, kidney stone  Social Hx: denies tobacco, ETOH, drugs  Family Hx: father: lung or throat cancer (smoker), 3 brothers with colon cancer, sister with thyroid caner  Meds:  reviewed  Allergies: oxycodone, prednisone    Labs:  2023:  Creatinine 0.87, liver enzymes WNL, CEA less than 1.73, WBC 5.60, Hgb 11.7, Hct 37.5, and plt 244,000.  CA 19-0 less than 2  23:CEA <1.73, cr 0.90, alb 4.0, ca 9.38, LFTs WNL, Ca 19-9 < 2, wbc 4.52, hgb 12.7, plt 255, ANC 2.28  2023:  Creatinine 1.09, AST 16, ALT 13, TSH 1.5, WBC 4.4, hemoglobin 12.2, platelets 258  2022 CA 19-9 <2, CEA 1.8, Cr 0.91, Alb 4.0, wbc 4.6, hgb 11.8, plt 270, ANC 2.04  22 CA 19-9 1 CEA <1.73 Cr 0.86 Alb 3.8 WBC 5.43 Hg 10.6 rbc 3.98 MCV 85.9 RDW 16.4   21 Cr 1.02 Alb 3.9 CEA 2.1 WBC 5.17 Hg 10.7  CA 19-9 <1  21 CEA 2.0, Cr 1.07, WBC 4.74, Hgb 10.3, , ANC 1.94, CA 19-9 <1  21 CA 19-9 <2 CBC and CMP wnl  20 Hg 9.5  20 Cr 0.92  20 Alb 3.1  12/3/20 amylase 2112  20 Lipase >2000 Tbili 5   AlkPhos 316    Imagin2023:  CT C/A/P:  No pulmonary nodules this exam.  Atherosclerotic and degenerative changes. Left renal cysts.     23: MMG BIRADS 1 negative  - 2023 CT CAP:  Stable tiny vague pulmonary nodules within the left lung field.  No evidence of recurrent tumor.  No evidence of metastatic disease    22 CT C/A/P: stable, nonspecific noncalcified bilateral pulmonary nodules. No other significant changes in the chest. S/p digna with no evidence of residual or recurrent tumor in the gallbladder fossa. Allowing for limitations of the exam, no other evidence of metastatic disease to the abdomen or pelvis. No other significant changes in the A/P.     21 screening MMG: BIRADS2    11/15/21 CT A/P w/ IV contrast: no evidence of recurrent tumor    21 CT C/A/P w/ IV contrast: no acute intrathoracic pathology. No evidence of metastatic to the chest. Borderline mild cardiomegaly, atherosclerotic disease, degenerative changes. Prominence of the main pulm artery. No acute intra abdominal or pelvic pathology. No evidence of  residual or recurrent tumor. S/p cholecystectomy    6/14/21 CT A/P w/wo contrast: No acute abnormality. Cholecystectomy. Left renal cysts. Umbilical hernia no longer present. Atherosclerotic and degenerative changes.  2/4/21 CT C/A/P w/ IV contrast: Local recurrence or metastatic disease is not demonstrated. No acute abnormalities are seen. Hepatomegaly.    12/10/20 US RLE: no DVT  12/2/20 US abd: calcific sludge and gallstones in gallbladder lumen, consistent with acute cholecystitis. Hepatomegaly and steatosis. CBD borderline dilatation at 7mm    Path:  12/7/20 cholecystectomy: invasive adenocarcinoma well differentiated, 4.5x3cm in gallbladder neck, tumor invades perimuscular connective tissue on peritoneal and hepatic sides of gallbladder. Serosal surface, liver parenchyma are free of tumor. Margins are negative. LVI+. No PNI. No regional nodes.  pT2b Nx      Review of Systems   Constitutional: Negative.  Negative for chills, fever and malaise/fatigue.   HENT: Negative.     Eyes: Negative.    Respiratory: Negative.  Negative for cough and shortness of breath.    Cardiovascular: Negative.  Negative for chest pain.   Gastrointestinal: Negative.  Negative for abdominal pain, diarrhea, nausea and vomiting.   Genitourinary: Negative.    Musculoskeletal: Negative.    Skin: Negative.  Negative for itching and rash.   Neurological: Negative.  Negative for focal weakness.   Endo/Heme/Allergies: Negative.    Psychiatric/Behavioral: Negative.            Objective:      Physical Exam  Vitals:    11/28/23 1414   BP: (!) 173/77   Pulse: 60   Resp: 20   Temp: 97.8 °F (36.6 °C)              ECOG PS 3  GA: AAOx3, NAD, morbidly obese  HEENT: NCAT, PERRLA, EOMI, poor dentition, no oral ulcers  LYMPH: no cervical, axillary or supraclavicular adenopathy  CVS: s1s2 RRR, no M/R/G  RESP: CTA b/l, no crackles, no wheezes or rhonchi  ABD: soft, NT, ND, BS+, no hepatosplenomegaly. 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 Stage IIB  CT C/A/P w/ IV contrast 2/2021 negative  Seen by SurgOnc and deemed not a surgical candidate due to multiple significant comorbidities  Requested MSI/MMR and PD-L1 on path specimen, no results  We previously discussed recommendation for adjuvant xeloda. Discussed that we can attempt at low dose to see if tolerated. Xeloda based adjuvant therapy would be 14/7 for 8 cycles.  Patient opted for no therapy, will image q6 months for 2 years and then as indicated  Referred to [facility] for opinion for possible adjuvant RT given no surgical intervention, she wished to continue with observation  Most recent imaging 5/2022 [facility]  MMG 11/2021 BIRADS2, repeat due in 1 year  CT C/A/P 1/2023 stable with no evidence of residual or recurrent disease.    11/28/2023 Assessment:  Patient is doing well.  No subjective or objective evidence of disease recurrence.  Labs stable.  CT 8/2023 with no evidence of disease recurrence.      Plan:     11/28/2023 Plan  Continue to monitor CEA and CA 19-9.   Repeat labs on follow up: CBC, CMP, CEA,  and return in 6 months for office visit.  CT C/A/P repeat in 1 year (8/2024).    The patient is in agreement with today's plan of care.  All questions answered.  Patient is aware to contact our office for any problems or concerns prior to next visit.      Kathie Ambriz, MSN-ED, APRN, AGACNP-BC, OCN

## 2024-05-29 ENCOUNTER — OFFICE VISIT (OUTPATIENT)
Dept: HEMATOLOGY/ONCOLOGY | Facility: CLINIC | Age: 79
End: 2024-05-29
Payer: MEDICARE

## 2024-05-29 VITALS
HEIGHT: 69 IN | OXYGEN SATURATION: 97 % | BODY MASS INDEX: 43.4 KG/M2 | WEIGHT: 293 LBS | RESPIRATION RATE: 16 BRPM | SYSTOLIC BLOOD PRESSURE: 166 MMHG | HEART RATE: 61 BPM | DIASTOLIC BLOOD PRESSURE: 87 MMHG | TEMPERATURE: 98 F

## 2024-05-29 DIAGNOSIS — Z08: ICD-10-CM

## 2024-05-29 DIAGNOSIS — Z85.09: ICD-10-CM

## 2024-05-29 DIAGNOSIS — C23 MALIGNANT NEOPLASM OF GALLBLADDER: Primary | ICD-10-CM

## 2024-05-29 PROCEDURE — 1101F PT FALLS ASSESS-DOCD LE1/YR: CPT | Mod: CPTII,,,

## 2024-05-29 PROCEDURE — 1126F AMNT PAIN NOTED NONE PRSNT: CPT | Mod: CPTII,,,

## 2024-05-29 PROCEDURE — 3079F DIAST BP 80-89 MM HG: CPT | Mod: CPTII,,,

## 2024-05-29 PROCEDURE — 1159F MED LIST DOCD IN RCRD: CPT | Mod: CPTII,,,

## 2024-05-29 PROCEDURE — 3077F SYST BP >= 140 MM HG: CPT | Mod: CPTII,,,

## 2024-05-29 PROCEDURE — 3288F FALL RISK ASSESSMENT DOCD: CPT | Mod: CPTII,,,

## 2024-05-29 PROCEDURE — 99215 OFFICE O/P EST HI 40 MIN: CPT | Mod: ,,,

## 2024-05-29 NOTE — PROGRESS NOTES
Patient ID: Ena Kruse is a 78 y.o. female.    Chief Complaint: Here for my check up.      History of Present Illness    Cardiology: [facility] Dr Paul    HPI: 77 y/o F w/ PMHx of ROWDY on CPAP (not compliant), OHS, CHF, CAD, PAD/PVD s/p left iliac vein stent, COPD, HTN, HLD, obesity, lymphedema LLE due to blood clot?, GERD presented 12/2020 with symptoms consistent with acute cholecystitis with acute pancreatitis. She had cholecystectomy with Dr Olivo on 12/7/20, incidentally found to have well differentiated adenocarcinoma, +LVI, negative margins, pT2b. Referred to [facility] to establish care.    1/2020 colonoscopy with Dr Sherman showing internal hemorrhoids, diverticulosis. Had GI bleed 1/2020    She opted against adjuvant xeloda and adjuvant RT    Interval History:    05/29/24:  Mrs. Kruse is here today for her follow-up and labs for history of gallbladder cancer with no adjuvant treatment.  Today, she reports, she is doing well. She continues with severe lymphedema in both lower legs, with left leg wrapped due to seepage of fluid.  She is in a wheelchair due to excess weight of 370 pounds and the lymphedema.  She denies any pain in the abdomen, bloating, indigestion, nausea, vomiting, fever, loss of appetite, change in bowel or bladder habits, no cough, confirms SOB with movement.  She has had no recent falls.  Eating well.  No recent hospitalizations, illness, or infections.      PMHx: ROWDY on CPAP (not compliant), OHS, CHF, CAD, PAD/PVD s/p left iliac vein stent, COPD, HTN, HLD, obesity, lymphedema LLE due to blood clot?, GERD  PSHx: tubal ligation, cholecystectomy, kidney stone  Social Hx: denies tobacco, ETOH, drugs  Family Hx: father: lung or throat cancer (smoker), 3 brothers with colon cancer, sister with thyroid caner  Meds: reviewed  Allergies: oxycodone, prednisone    Labs:  05/20/24: CA 19-9 <2, CEA <1.73, CMP unremarkable, wbc 4.60, hgb 11.7, plt 240, ANC 2.27  11/21/2023:  Creatinine 0.87,  liver enzymes WNL, CEA less than 1.73, WBC 5.60, Hgb 11.7, Hct 37.5, and plt 244,000.  CA 19-0 less than 2  23:CEA <1.73, cr 0.90, alb 4.0, ca 9.38, LFTs WNL, Ca 19-9 < 2, wbc 4.52, hgb 12.7, plt 255, ANC 2.28  2023:  Creatinine 1.09, AST 16, ALT 13, TSH 1.5, WBC 4.4, hemoglobin 12.2, platelets 258  2022 CA 19-9 <2, CEA 1.8, Cr 0.91, Alb 4.0, wbc 4.6, hgb 11.8, plt 270, ANC 2.04  22 CA 19-9 1 CEA <1.73 Cr 0.86 Alb 3.8 WBC 5.43 Hg 10.6 rbc 3.98 MCV 85.9 RDW 16.4   21 Cr 1.02 Alb 3.9 CEA 2.1 WBC 5.17 Hg 10.7  CA 19-9 <1  21 CEA 2.0, Cr 1.07, WBC 4.74, Hgb 10.3, , ANC 1.94, CA 19-9 <1  21 CA 19-9 <2 CBC and CMP wnl  20 Hg 9.5  20 Cr 0.92  20 Alb 3.1  12/3/20 amylase 2112  20 Lipase >2000 Tbili 5   AlkPhos 316    Imagin2023:  CT C/A/P:  No pulmonary nodules this exam.  Atherosclerotic and degenerative changes. Left renal cysts.     23: MMG BIRADS 1 negative  - 2023 CT CAP:  Stable tiny vague pulmonary nodules within the left lung field.  No evidence of recurrent tumor.  No evidence of metastatic disease    22 CT C/A/P: stable, nonspecific noncalcified bilateral pulmonary nodules. No other significant changes in the chest. S/p digna with no evidence of residual or recurrent tumor in the gallbladder fossa. Allowing for limitations of the exam, no other evidence of metastatic disease to the abdomen or pelvis. No other significant changes in the A/P.     21 screening MMG: BIRADS2    11/15/21 CT A/P w/ IV contrast: no evidence of recurrent tumor    21 CT C/A/P w/ IV contrast: no acute intrathoracic pathology. No evidence of metastatic to the chest. Borderline mild cardiomegaly, atherosclerotic disease, degenerative changes. Prominence of the main pulm artery. No acute intra abdominal or pelvic pathology. No evidence of residual or recurrent tumor. S/p cholecystectomy    21 CT A/P w/wo contrast:  No acute abnormality. Cholecystectomy. Left renal cysts. Umbilical hernia no longer present. Atherosclerotic and degenerative changes.  2/4/21 CT C/A/P w/ IV contrast: Local recurrence or metastatic disease is not demonstrated. No acute abnormalities are seen. Hepatomegaly.    12/10/20 US RLE: no DVT  12/2/20 US abd: calcific sludge and gallstones in gallbladder lumen, consistent with acute cholecystitis. Hepatomegaly and steatosis. CBD borderline dilatation at 7mm    Path:  12/7/20 cholecystectomy: invasive adenocarcinoma well differentiated, 4.5x3cm in gallbladder neck, tumor invades perimuscular connective tissue on peritoneal and hepatic sides of gallbladder. Serosal surface, liver parenchyma are free of tumor. Margins are negative. LVI+. No PNI. No regional nodes.  pT2b Nx      Review of Systems   Constitutional: Negative.  Negative for chills, fever and malaise/fatigue.   HENT: Negative.     Eyes: Negative.    Respiratory: Negative.  Negative for cough and shortness of breath.    Cardiovascular: Negative.  Negative for chest pain.   Gastrointestinal: Negative.  Negative for abdominal pain, diarrhea, nausea and vomiting.   Genitourinary: Negative.    Musculoskeletal: Negative.    Skin: Negative.  Negative for itching and rash.   Neurological: Negative.  Negative for focal weakness.   Endo/Heme/Allergies: Negative.    Psychiatric/Behavioral: Negative.            Objective:      Physical Exam  Vitals:    05/29/24 1422   BP: (!) 166/87   Pulse: 61   Resp: 16   Temp: 98 °F (36.7 °C)              ECOG PS 3  GA: AAOx3, NAD, morbidly obese  HEENT: NCAT, PERRLA, EOMI, poor dentition, no oral ulcers  LYMPH: no cervical, axillary or supraclavicular adenopathy  CVS: s1s2 RRR, no M/R/G  RESP: CTA b/l, no crackles, no wheezes or rhonchi  ABD: soft, NT, ND, BS+, no hepatosplenomegaly. 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 Stage IIB  CT C/A/P w/ IV contrast 2/2021 negative  Seen by SurgOnc and deemed not a surgical candidate due to multiple significant comorbidities  Requested MSI/MMR and PD-L1 on path specimen, no results  We previously discussed recommendation for adjuvant xeloda. Discussed that we can attempt at low dose to see if tolerated. Xeloda based adjuvant therapy would be 14/7 for 8 cycles.  Patient opted for no therapy, will follow NCCN surveillance  Consider imaging every 3-6 mo for 2 y, then every 6-12 mo for up to 5 y, or as clinically indicated  Consider CEA and CA 19-9 as clinically indicated  Referred to [facility] for opinion for possible adjuvant RT given no surgical intervention, she wished to continue with observation  Most recent imaging 5/2022 [facility]  MMG 11/2021 BIRADS2, repeat due in 1 year  CT C/A/P 1/2023 stable with no evidence of residual or recurrent disease.          Plan:   Continue to monitor CEA and CA 19-9.  Repeat labs on follow up: CBC, CMP, CEA,  and return in 3 months for office visit.  CT C/A/P ordered today for 8/2024    The patient is in agreement with today's plan of care.  All questions answered.  Patient is aware to contact our office for any problems or concerns prior to next visit.

## 2024-08-29 ENCOUNTER — OFFICE VISIT (OUTPATIENT)
Dept: HEMATOLOGY/ONCOLOGY | Facility: CLINIC | Age: 79
End: 2024-08-29
Payer: MEDICARE

## 2024-08-29 VITALS
DIASTOLIC BLOOD PRESSURE: 83 MMHG | OXYGEN SATURATION: 94 % | SYSTOLIC BLOOD PRESSURE: 188 MMHG | TEMPERATURE: 98 F | WEIGHT: 293 LBS | BODY MASS INDEX: 43.4 KG/M2 | RESPIRATION RATE: 14 BRPM | HEIGHT: 69 IN | HEART RATE: 59 BPM

## 2024-08-29 DIAGNOSIS — C23 MALIGNANT NEOPLASM OF GALLBLADDER: Primary | ICD-10-CM

## 2024-08-29 DIAGNOSIS — Z85.09: ICD-10-CM

## 2024-08-29 DIAGNOSIS — Z08: ICD-10-CM

## 2024-08-29 PROCEDURE — 3077F SYST BP >= 140 MM HG: CPT | Mod: CPTII,,, | Performed by: STUDENT IN AN ORGANIZED HEALTH CARE EDUCATION/TRAINING PROGRAM

## 2024-08-29 PROCEDURE — 3288F FALL RISK ASSESSMENT DOCD: CPT | Mod: CPTII,,, | Performed by: STUDENT IN AN ORGANIZED HEALTH CARE EDUCATION/TRAINING PROGRAM

## 2024-08-29 PROCEDURE — 99214 OFFICE O/P EST MOD 30 MIN: CPT | Mod: ,,, | Performed by: STUDENT IN AN ORGANIZED HEALTH CARE EDUCATION/TRAINING PROGRAM

## 2024-08-29 PROCEDURE — 3079F DIAST BP 80-89 MM HG: CPT | Mod: CPTII,,, | Performed by: STUDENT IN AN ORGANIZED HEALTH CARE EDUCATION/TRAINING PROGRAM

## 2024-08-29 PROCEDURE — 1159F MED LIST DOCD IN RCRD: CPT | Mod: CPTII,,, | Performed by: STUDENT IN AN ORGANIZED HEALTH CARE EDUCATION/TRAINING PROGRAM

## 2024-08-29 PROCEDURE — 1126F AMNT PAIN NOTED NONE PRSNT: CPT | Mod: CPTII,,, | Performed by: STUDENT IN AN ORGANIZED HEALTH CARE EDUCATION/TRAINING PROGRAM

## 2024-08-29 PROCEDURE — 1101F PT FALLS ASSESS-DOCD LE1/YR: CPT | Mod: CPTII,,, | Performed by: STUDENT IN AN ORGANIZED HEALTH CARE EDUCATION/TRAINING PROGRAM

## 2024-08-29 RX ORDER — DOXYCYCLINE 100 MG/1
100 CAPSULE ORAL 2 TIMES DAILY
COMMUNITY

## 2024-08-29 NOTE — PROGRESS NOTES
Patient ID: Ena Kruse is a 78 y.o. female.    Chief Complaint:   Chief Complaint   Patient presents with    Follow-up     Patient states she was just in [facility] for her legs.            History of Present Illness    Cardiology: [facility] Dr Paul    HPI: 75 y/o F w/ PMHx of ROWDY on CPAP (not compliant), OHS, CHF, CAD, PAD/PVD s/p left iliac vein stent, COPD, HTN, HLD, obesity, lymphedema LLE due to blood clot?, GERD presented 12/2020 with symptoms consistent with acute cholecystitis with acute pancreatitis. She had cholecystectomy with Dr Olivo on 12/7/20, incidentally found to have well differentiated adenocarcinoma, +LVI, negative margins, pT2b. Referred to [facility] to establish care.    1/2020 colonoscopy with Dr Sherman showing internal hemorrhoids, diverticulosis. Had GI bleed 1/2020    She opted against adjuvant xeloda and adjuvant RT    Interval History:    Today, 08/29/2024, patient denies any acute concerns today.  She reports intermittent pain around the incision site in the right upper quadrant which resolves without intervention.  She denies any new foci of pain, decreased appetite, weight loss.  She reports intermittent loose bowel movements.  She denies any nausea, vomiting, new medications.  She had a ER visit for lower extremity cellulitis recently and was discharged home with home health.    PMHx: ROWDY on CPAP (not compliant), OHS, CHF, CAD, PAD/PVD s/p left iliac vein stent, COPD, HTN, HLD, obesity, lymphedema LLE due to blood clot?, GERD  PSHx: tubal ligation, cholecystectomy, kidney stone  Social Hx: denies tobacco, ETOH, drugs  Family Hx: father: lung or throat cancer (smoker), 3 brothers with colon cancer, sister with thyroid caner  Meds: reviewed  Allergies: oxycodone, prednisone    Labs:    08/23/2024 CEA less than 1.9, CMP unremarkable, WBC count 5.15, hemoglobin 11.4, MCV 90.1, platelet count 2 1 2, ANC 2.25.  CA 19-9 less than 2.    05/20/24: CA 19-9 <2, CEA <1.73, CMP  unremarkable, wbc 4.60, hgb 11.7, plt 240, ANC 2.27  2023:  Creatinine 0.87, liver enzymes WNL, CEA less than 1.73, WBC 5.60, Hgb 11.7, Hct 37.5, and plt 244,000.  CA 19-0 less than 2  23:CEA <1.73, cr 0.90, alb 4.0, ca 9.38, LFTs WNL, Ca 19-9 < 2, wbc 4.52, hgb 12.7, plt 255, ANC 2.28  2023:  Creatinine 1.09, AST 16, ALT 13, TSH 1.5, WBC 4.4, hemoglobin 12.2, platelets 258  2022 CA 19-9 <2, CEA 1.8, Cr 0.91, Alb 4.0, wbc 4.6, hgb 11.8, plt 270, ANC 2.04  22 CA 19-9 1 CEA <1.73 Cr 0.86 Alb 3.8 WBC 5.43 Hg 10.6 rbc 3.98 MCV 85.9 RDW 16.4   21 Cr 1.02 Alb 3.9 CEA 2.1 WBC 5.17 Hg 10.7  CA 19-9 <1  21 CEA 2.0, Cr 1.07, WBC 4.74, Hgb 10.3, , ANC 1.94, CA 19-9 <1  21 CA 19-9 <2 CBC and CMP wnl  20 Hg 9.5  20 Cr 0.92  20 Alb 3.1  12/3/20 amylase 2112  20 Lipase >2000 Tbili 5   AlkPhos 316    Imagin2024 CT chest abdomen pelvis with contrast:  No significant change.  Stable tiny nonspecific left upper lobe nodule but no other findings to suggest metastatic disease to the chest.  Other chronic findings as noted above.  Status post cholecystectomy with no findings to suggest metastatic disease to the abdomen or pelvis.  No significant change.  Other chronic findings as noted above.    2023:  CT C/A/P:  No pulmonary nodules this exam.  Atherosclerotic and degenerative changes. Left renal cysts.     23: MMG BIRADS 1 negative  - 2023 CT CAP:  Stable tiny vague pulmonary nodules within the left lung field.  No evidence of recurrent tumor.  No evidence of metastatic disease    22 CT C/A/P: stable, nonspecific noncalcified bilateral pulmonary nodules. No other significant changes in the chest. S/p digna with no evidence of residual or recurrent tumor in the gallbladder fossa. Allowing for limitations of the exam, no other evidence of metastatic disease to the abdomen or pelvis. No other significant  changes in the A/P.     11/17/21 screening MMG: BIRADS2    11/15/21 CT A/P w/ IV contrast: no evidence of recurrent tumor    9/20/21 CT C/A/P w/ IV contrast: no acute intrathoracic pathology. No evidence of metastatic to the chest. Borderline mild cardiomegaly, atherosclerotic disease, degenerative changes. Prominence of the main pulm artery. No acute intra abdominal or pelvic pathology. No evidence of residual or recurrent tumor. S/p cholecystectomy    6/14/21 CT A/P w/wo contrast: No acute abnormality. Cholecystectomy. Left renal cysts. Umbilical hernia no longer present. Atherosclerotic and degenerative changes.  2/4/21 CT C/A/P w/ IV contrast: Local recurrence or metastatic disease is not demonstrated. No acute abnormalities are seen. Hepatomegaly.    12/10/20 US RLE: no DVT  12/2/20 US abd: calcific sludge and gallstones in gallbladder lumen, consistent with acute cholecystitis. Hepatomegaly and steatosis. CBD borderline dilatation at 7mm    Path:  12/7/20 cholecystectomy: invasive adenocarcinoma well differentiated, 4.5x3cm in gallbladder neck, tumor invades perimuscular connective tissue on peritoneal and hepatic sides of gallbladder. Serosal surface, liver parenchyma are free of tumor. Margins are negative. LVI+. No PNI. No regional nodes.  pT2b Nx    Review of systems     CONSTITUTIONAL: no fevers, no chills, no weight loss, no fatigue, no weakness  HEMATOLOGIC: no abnormal bleeding, no abnormal bruising, no drenching night sweats  ONCOLOGIC: no new masses or lumps  HEENT: no vision loss, no tinnitus or hearing loss, no nose bleeding, no dysphagia, no odynophagia  CVS: no chest pain, no palpitations, no dyspnea on exertion  RESP: no shortness of breath, no hemoptysis, no cough  GI: no nausea, no vomiting, no diarrhea, no constipation, no melena, no hematochezia, no hematemesis, no abdominal pain, no increase in abdominal girth  : no dysuria, no hematuria, no hesitancy, no scrotal swelling, no  discharge  INTEGUMENT: no rashes, no abnormal bruising, no nail pitting, no hyperpigmentation  NEURO: no falls, no memory loss, no paresthesias or dysesthesias, no urofecal incontinence or retention, no loss of strength on any extremity  MSK: no back pain, no new joint pain, no joint swelling  PSYCH: no suicidal or homicidal ideation, no depression, no insomnia, no anhedonia  ENDOCRINE: no heat or cold intolerance, no polyuria, no polydipsia             Objective:      Physical Exam  Vitals:    08/29/24 0850   BP: (!) 188/83   Pulse: (!) 59   Resp: 14   Temp: 97.9 °F (36.6 °C)       GA: AAOx3, NAD, morbidly obese, in a wheelchair accompanied by her daughter.  HEENT:  moist oral mucous membranes  RESP:  Equal chest rise, no accessory muscle use  EXT: no deformities, bilateral lower lower extremity pitting edema, with stasis dermatitis.  Left lower extremity wrapped in an Ace wrap.  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 Stage IIB  CT C/A/P w/ IV contrast 2/2021 negative  Seen by SurgOnc and deemed not a surgical candidate due to multiple significant comorbidities  Requested MSI/MMR and PD-L1 on path specimen, no results  We previously discussed recommendation for adjuvant xeloda. Discussed that we can attempt at low dose to see if tolerated. Xeloda based adjuvant therapy would be 14/7 for 8 cycles.  Patient opted for no therapy, will follow NCCN surveillance  Consider imaging every 3-6 mo for 2 y, then every 6-12 mo for up to 5 y, or as clinically indicated  Consider CEA and CA 19-9 as clinically indicated  Referred to [facility] for opinion for possible adjuvant RT given no surgical intervention, she wished to continue with observation  imaging 5/2022 [facility]  MMG 11/2021 BIRADS2, repeat due in 1 year  CT C/A/P 1/2023 stable with no evidence  of residual or recurrent disease.  CT chest abdomen pelvis with contrast in August 2024 with no evidence of disease       Plan:       Continue to monitor CEA and CA 19-9.  Repeat labs on follow up: CBC, CMP, CEA,  and return in 6 months  Annual CT C/A/P to be ordered at next visit.      A total of  30 minutes were spent in review of records, interpretation of test, coordination of care, discussion and counseling with the patient.        Portions of the record may have been created with voice recognition software. Occasional wrong-word or sound-a-like substitutions may have occurred due to the inherent limitations of voice recognition software.

## 2024-09-05 ENCOUNTER — EXTERNAL HOME HEALTH (OUTPATIENT)
Dept: HOME HEALTH SERVICES | Facility: HOSPITAL | Age: 79
End: 2024-09-05
Payer: MEDICARE

## 2025-03-17 ENCOUNTER — OFFICE VISIT (OUTPATIENT)
Dept: HEMATOLOGY/ONCOLOGY | Facility: CLINIC | Age: 80
End: 2025-03-17
Payer: MEDICARE

## 2025-03-17 VITALS
HEART RATE: 56 BPM | SYSTOLIC BLOOD PRESSURE: 145 MMHG | BODY MASS INDEX: 43.4 KG/M2 | WEIGHT: 293 LBS | RESPIRATION RATE: 16 BRPM | OXYGEN SATURATION: 98 % | HEIGHT: 69 IN | TEMPERATURE: 98 F | DIASTOLIC BLOOD PRESSURE: 81 MMHG

## 2025-03-17 DIAGNOSIS — C23 MALIGNANT NEOPLASM OF GALLBLADDER: Primary | ICD-10-CM

## 2025-03-17 DIAGNOSIS — Z85.09: ICD-10-CM

## 2025-03-17 DIAGNOSIS — Z08: ICD-10-CM

## 2025-03-17 PROCEDURE — 3288F FALL RISK ASSESSMENT DOCD: CPT | Mod: CPTII,,,

## 2025-03-17 PROCEDURE — 3077F SYST BP >= 140 MM HG: CPT | Mod: CPTII,,,

## 2025-03-17 PROCEDURE — 1160F RVW MEDS BY RX/DR IN RCRD: CPT | Mod: CPTII,,,

## 2025-03-17 PROCEDURE — 1159F MED LIST DOCD IN RCRD: CPT | Mod: CPTII,,,

## 2025-03-17 PROCEDURE — 1101F PT FALLS ASSESS-DOCD LE1/YR: CPT | Mod: CPTII,,,

## 2025-03-17 PROCEDURE — 3079F DIAST BP 80-89 MM HG: CPT | Mod: CPTII,,,

## 2025-03-17 PROCEDURE — 99214 OFFICE O/P EST MOD 30 MIN: CPT | Mod: ,,,

## 2025-03-17 RX ORDER — CARVEDILOL 25 MG/1
25 TABLET ORAL DAILY
COMMUNITY
Start: 2025-01-16

## 2025-03-17 RX ORDER — ERGOCALCIFEROL 1.25 MG/1
50000 CAPSULE ORAL
COMMUNITY
Start: 2025-01-16

## 2025-03-17 NOTE — PROGRESS NOTES
Patient ID: Ena Kruse is a 79 y.o. female.    Chief Complaint:   Chief Complaint   Patient presents with    Results       History of Present Illness    Cardiology: [facility] Dr Paul    HPI: 77 y/o F w/ PMHx of ROWDY on CPAP (not compliant), OHS, CHF, CAD, PAD/PVD s/p left iliac vein stent, COPD, HTN, HLD, obesity, lymphedema LLE due to blood clot?, GERD presented 12/2020 with symptoms consistent with acute cholecystitis with acute pancreatitis. She had cholecystectomy with Dr Olivo on 12/7/20, incidentally found to have well differentiated adenocarcinoma, +LVI, negative margins, pT2b. Referred to [facility] to establish care.    1/2020 colonoscopy with Dr Sherman showing internal hemorrhoids, diverticulosis. Had GI bleed 1/2020    She opted against adjuvant xeloda and adjuvant RT    Interval History:    Today, 3/14/25, patient denies any acute concerns today. She denies any new foci of pain, decreased appetite, weight loss. She denies any nausea, vomiting, new medications.  She is currently suffering with lymphedema of the lower extremities.    PMHx: ROWDY on CPAP (not compliant), OHS, CHF, CAD, PAD/PVD s/p left iliac vein stent, COPD, HTN, HLD, obesity, lymphedema LLE due to blood clot?, GERD  PSHx: tubal ligation, cholecystectomy, kidney stone  Social Hx: denies tobacco, ETOH, drugs  Family Hx: father: lung or throat cancer (smoker), 3 brothers with colon cancer, sister with thyroid caner  Meds: reviewed  Allergies: oxycodone, prednisone    Labs:  3/14/25  CMP unremarkable, CBC unremarkable  CA 19-9 pending    08/23/2024 CEA less than 1.9, CMP unremarkable, WBC count 5.15, hemoglobin 11.4, MCV 90.1, platelet count 2 1 2, ANC 2.25.  CA 19-9 less than 2.    05/20/24: CA 19-9 <2, CEA <1.73, CMP unremarkable, wbc 4.60, hgb 11.7, plt 240, ANC 2.27  11/21/2023:  Creatinine 0.87, liver enzymes WNL, CEA less than 1.73, WBC 5.60, Hgb 11.7, Hct 37.5, and plt 244,000.  CA 19-0 less than 2  05/16/23:CEA <1.73, cr 0.90, alb  4.0, ca 9.38, LFTs WNL, Ca 19-9 < 2, wbc 4.52, hgb 12.7, plt 255, ANC 2.28  2023:  Creatinine 1.09, AST 16, ALT 13, TSH 1.5, WBC 4.4, hemoglobin 12.2, platelets 258  2022 CA 19-9 <2, CEA 1.8, Cr 0.91, Alb 4.0, wbc 4.6, hgb 11.8, plt 270, ANC 2.04  22 CA 19-9 1 CEA <1.73 Cr 0.86 Alb 3.8 WBC 5.43 Hg 10.6 rbc 3.98 MCV 85.9 RDW 16.4   21 Cr 1.02 Alb 3.9 CEA 2.1 WBC 5.17 Hg 10.7  CA 19-9 <1  21 CEA 2.0, Cr 1.07, WBC 4.74, Hgb 10.3, , ANC 1.94, CA 19-9 <1  21 CA 19-9 <2 CBC and CMP wnl  20 Hg 9.5  20 Cr 0.92  20 Alb 3.1  12/3/20 amylase 2112  20 Lipase >2000 Tbili 5   AlkPhos 316    Imagin2024 CT chest abdomen pelvis with contrast:  No significant change.  Stable tiny nonspecific left upper lobe nodule but no other findings to suggest metastatic disease to the chest.  Other chronic findings as noted above.  Status post cholecystectomy with no findings to suggest metastatic disease to the abdomen or pelvis.  No significant change.  Other chronic findings as noted above.    2023:  CT C/A/P:  No pulmonary nodules this exam.  Atherosclerotic and degenerative changes. Left renal cysts.     23: MMG BIRADS 1 negative  - 2023 CT CAP:  Stable tiny vague pulmonary nodules within the left lung field.  No evidence of recurrent tumor.  No evidence of metastatic disease    22 CT C/A/P: stable, nonspecific noncalcified bilateral pulmonary nodules. No other significant changes in the chest. S/p digna with no evidence of residual or recurrent tumor in the gallbladder fossa. Allowing for limitations of the exam, no other evidence of metastatic disease to the abdomen or pelvis. No other significant changes in the A/P.     21 screening MMG: BIRADS2    11/15/21 CT A/P w/ IV contrast: no evidence of recurrent tumor    21 CT C/A/P w/ IV contrast: no acute intrathoracic pathology. No evidence of metastatic to the  chest. Borderline mild cardiomegaly, atherosclerotic disease, degenerative changes. Prominence of the main pulm artery. No acute intra abdominal or pelvic pathology. No evidence of residual or recurrent tumor. S/p cholecystectomy    6/14/21 CT A/P w/wo contrast: No acute abnormality. Cholecystectomy. Left renal cysts. Umbilical hernia no longer present. Atherosclerotic and degenerative changes.  2/4/21 CT C/A/P w/ IV contrast: Local recurrence or metastatic disease is not demonstrated. No acute abnormalities are seen. Hepatomegaly.    12/10/20 US RLE: no DVT  12/2/20 US abd: calcific sludge and gallstones in gallbladder lumen, consistent with acute cholecystitis. Hepatomegaly and steatosis. CBD borderline dilatation at 7mm    Path:  12/7/20 cholecystectomy: invasive adenocarcinoma well differentiated, 4.5x3cm in gallbladder neck, tumor invades perimuscular connective tissue on peritoneal and hepatic sides of gallbladder. Serosal surface, liver parenchyma are free of tumor. Margins are negative. LVI+. No PNI. No regional nodes.  pT2b Nx    Review of systems     CONSTITUTIONAL: no fevers, no chills, no weight loss, no fatigue, no weakness  HEMATOLOGIC: no abnormal bleeding, no abnormal bruising, no drenching night sweats  ONCOLOGIC: no new masses or lumps  HEENT: no vision loss, no tinnitus or hearing loss, no nose bleeding, no dysphagia, no odynophagia  CVS: no chest pain, no palpitations, no dyspnea on exertion  RESP: no shortness of breath, no hemoptysis, no cough  GI: no nausea, no vomiting, no diarrhea, no constipation, no melena, no hematochezia, no hematemesis, no abdominal pain, no increase in abdominal girth  : no dysuria, no hematuria, no hesitancy, no scrotal swelling, no discharge  INTEGUMENT: no rashes, no abnormal bruising, no nail pitting, no hyperpigmentation  NEURO: no falls, no memory loss, no paresthesias or dysesthesias, no urofecal incontinence or retention, no loss of strength on any  extremity  MSK: no back pain, no new joint pain, no joint swelling, severe edema to lower extremities  PSYCH: no suicidal or homicidal ideation, no depression, no insomnia, no anhedonia  ENDOCRINE: no heat or cold intolerance, no polyuria, no polydipsia             Objective:      Physical Exam  Vitals:    03/17/25 1135   BP: (!) 145/81   Pulse: (!) 56   Resp: 16   Temp: 98 °F (36.7 °C)       GA: AAOx3, NAD, morbidly obese, in a wheelchair accompanied by her daughter.  HEENT:  moist oral mucous membranes  RESP:  Equal chest rise, no accessory muscle use  EXT: no deformities, bilateral lower lower extremity pitting edema, with stasis dermatitis.  Bilateral lower extremities wrapped in an Ace wrap.  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 Stage IIB  CT C/A/P w/ IV contrast 2/2021 negative  Seen by SurgOnc and deemed not a surgical candidate due to multiple significant comorbidities  Requested MSI/MMR and PD-L1 on path specimen, no results  We previously discussed recommendation for adjuvant xeloda. Discussed that we can attempt at low dose to see if tolerated. Xeloda based adjuvant therapy would be 14/7 for 8 cycles.  Patient opted for no therapy, will follow NCCN surveillance  Consider imaging every 3-6 mo for 2 y, then every 6-12 mo for up to 5 y, or as clinically indicated  Consider CEA and CA 19-9 as clinically indicated  Referred to [facility] for opinion for possible adjuvant RT given no surgical intervention, she wished to continue with observation  imaging 5/2022 [facility]  MMG 11/2021 BIRADS2, repeat due in 1 year  CT C/A/P 1/2023 stable with no evidence of residual or recurrent disease.  CT chest abdomen pelvis with contrast in August 2024 with no evidence of disease       Plan:     Continue to monitor CEA and CA 19-9.  Repeat labs on  follow up: CBC, CMP, CEA,  and return in 6 months  Annual CT C/A/P ordered today    Dori KHAN